# Patient Record
Sex: FEMALE | Race: WHITE | Employment: FULL TIME | URBAN - METROPOLITAN AREA
[De-identification: names, ages, dates, MRNs, and addresses within clinical notes are randomized per-mention and may not be internally consistent; named-entity substitution may affect disease eponyms.]

---

## 2017-05-21 ENCOUNTER — APPOINTMENT (OUTPATIENT)
Dept: GENERAL RADIOLOGY | Age: 57
End: 2017-05-21
Payer: COMMERCIAL

## 2017-05-21 ENCOUNTER — HOSPITAL ENCOUNTER (OUTPATIENT)
Age: 57
Discharge: HOME | End: 2017-05-21
Attending: FAMILY MEDICINE
Payer: COMMERCIAL

## 2017-05-21 VITALS
OXYGEN SATURATION: 97 % | HEART RATE: 72 BPM | DIASTOLIC BLOOD PRESSURE: 66 MMHG | TEMPERATURE: 99 F | RESPIRATION RATE: 16 BRPM | SYSTOLIC BLOOD PRESSURE: 101 MMHG

## 2017-05-21 DIAGNOSIS — S92.534A CLOSED NONDISPLACED FRACTURE OF DISTAL PHALANX OF LESSER TOE OF RIGHT FOOT, INITIAL ENCOUNTER: Primary | ICD-10-CM

## 2017-05-21 PROCEDURE — 73660 X-RAY EXAM OF TOE(S): CPT

## 2017-05-21 PROCEDURE — 99213 OFFICE O/P EST LOW 20 MIN: CPT

## 2017-05-21 PROCEDURE — 28510 TREATMENT OF TOE FRACTURE: CPT

## 2017-05-21 PROCEDURE — 99202 OFFICE O/P NEW SF 15 MIN: CPT

## 2017-05-21 NOTE — ED INITIAL ASSESSMENT (HPI)
Patient presents to [facility]. Care with cc of right third toe injury when she inadvertantly dropped a pan on it. +Swollen,bruised and limited movement and pain with walking.

## 2017-05-21 NOTE — ED PROVIDER NOTES
Patient Seen in: [facility] END    History   Patient presents with:  Lower Extremity Injury (musculoskeletal)    Stated Complaint: toe injury     HPI    Patient is a 61-year-old female.   Coming today with complaint of a right third week      Review of Systems   Constitutional: Positive for activity change. Negative for fever, chills, diaphoresis, appetite change, fatigue and unexpected weight change. HENT: Negative. Eyes: Negative. Respiratory: Negative.     Cardiovascular: Ne seconds. No bleeding noted. Neurological: She is alert and oriented to person, place, and time. Skin: Skin is warm and dry. No rash noted. She is not diaphoretic. No erythema. No pallor. Nursing note and vitals reviewed.             ED Course   Labs

## 2017-08-24 PROCEDURE — 87624 HPV HI-RISK TYP POOLED RSLT: CPT | Performed by: FAMILY MEDICINE

## 2017-08-24 PROCEDURE — 88175 CYTOPATH C/V AUTO FLUID REDO: CPT | Performed by: FAMILY MEDICINE

## 2018-02-12 PROCEDURE — 82607 VITAMIN B-12: CPT | Performed by: OTHER

## 2018-02-12 PROCEDURE — 83921 ORGANIC ACID SINGLE QUANT: CPT | Performed by: OTHER

## 2018-02-12 PROCEDURE — 82746 ASSAY OF FOLIC ACID SERUM: CPT | Performed by: OTHER

## 2018-03-30 PROBLEM — M25.531 RIGHT WRIST PAIN: Status: ACTIVE | Noted: 2018-03-30

## 2018-03-30 PROBLEM — M25.532 LEFT WRIST PAIN: Status: ACTIVE | Noted: 2018-03-30

## 2023-01-13 PROBLEM — D12.3 BENIGN NEOPLASM OF TRANSVERSE COLON: Status: ACTIVE | Noted: 2023-01-13

## 2023-01-13 PROBLEM — Z12.11 SPECIAL SCREENING FOR MALIGNANT NEOPLASMS, COLON: Status: ACTIVE | Noted: 2023-01-13

## 2024-04-12 ENCOUNTER — OFFICE VISIT (OUTPATIENT)
Dept: PODIATRY CLINIC | Facility: CLINIC | Age: 64
End: 2024-04-12
Payer: COMMERCIAL

## 2024-04-12 DIAGNOSIS — M20.42 HAMMERTOE OF LEFT FOOT: Primary | ICD-10-CM

## 2024-04-12 DIAGNOSIS — M20.41 HAMMERTOE OF RIGHT FOOT: ICD-10-CM

## 2024-04-19 NOTE — PROGRESS NOTES
[facility]  Progress Note    Amara Weaver is a 64 year old female. No chief complaint on file.        HPI:     Patient is a pleasant 64-year-old female who presents to clinic for evaluation of toe deformity that causes rubbing on her second toes and great toes.  She typically tapes her toes which works well to prevent rubbing.  Occasionally they cause irritation but they do not cause especially bad pain.  She presents today for reevaluation of the sites.  No other complaints are mentioned.  Past medical history, occasions, allergies reviewed.      Allergies: Patient has no known allergies.   Current Outpatient Medications   Medication Sig Dispense Refill    PEG 3350-KCl-Na Bicarb-NaCl 420 g Oral Recon Soln Take as directed by physician 4000 mL 0    escitalopram (LEXAPRO) 10 MG Oral Tab Take 1 tablet (10 mg total) by mouth daily. 30 tablet 5    triamcinolone acetonide 0.1 % External Cream Apply 1 Application topically 2 (two) times daily.      Calcium Carb-Cholecalciferol (CALCIUM 600+D3 OR) Take by mouth.      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    Osteoporosis    Osteopenia detected    PONV (postoperative nausea and vomiting)    Presence of other cardiac implants and grafts    Multiple breast clips inserted in both breasts - used to fiona areas where breast biopsies were performed (all benign)    Sclerosing adenosis of breast, right    on breast biopsy    Wears glasses    Wear 1 contact for reading, 1 contact for slight distance enhancement      Past Surgical History:   Procedure  Laterality Date    Benign biopsy left      done at [facility]    Benign biopsy right      done at [facility]              Colonoscopy      Colonoscopy      No findings    D & c          Dilation/curettage,diagnostic      Hernia surgery  2019    Repair epigastric hernia with mesh    Alayna biopsy stereo w/calc 1 site right (cpt=19081)       benign    Alayna biopsy stereo w/calc 2 site left (cpt=19081/77486)      both benign    Alayna localization wire 1 site left (cpt=19281)      benign    Mri breast biopsy w/ clip 1 site (cpt=19085)  2011    Right breast benign    Other surgical history      LEFT BREAST BIOPSY 11/15/10    Stereotactic breast biopsy  2011    RIGHT BIOPSY    Us breast biopsy 1 site left (cpt=19083)      benign      Family History   Problem Relation Age of Onset    Lipids Father     Prostate Cancer Father     Breast Cancer Mother 52        unilateral ca    Heart Attack Maternal Grandfather     Breast Cancer Paternal Grandmother         dx early 60's    No Known Problems Daughter     No Known Problems Daughter     Ovarian Cancer Paternal Cousin Female         dx in 20's    Colon Cancer Paternal Cousin Female         dx in 20's    No Known Problems Paternal Uncle     No Known Problems Paternal Uncle     Other (Esophageal Cancer) Paternal Cousin Female         dx mid 50's      Social History     Socioeconomic History    Marital status:    Tobacco Use    Smoking status: Never    Smokeless tobacco: Never    Tobacco comments:     None   Substance and Sexual Activity    Alcohol use: Yes     Alcohol/week: 10.0 standard drinks of alcohol     Types: 10 Glasses of wine per week    Drug use: No           REVIEW OF SYSTEMS:     Today reviewed systems as documented below  GENERAL HEALTH: feels well otherwise  SKIN: denies any unusual skin lesions or rashes  RESPIRATORY: denies shortness of breath with exertion  CARDIOVASCULAR: denies chest  pain on exertion  GI: denies abdominal pain and denies heartburn  NEURO: denies headaches  MUSCULO: history of arthritis      EXAM:   [facility] 06/08/2011   GENERAL: well developed, well nourished, in no apparent distress  EXTREMITIES:   1. Integument: Normal skin temperature and turgor  2. Vascular: Dorsalis pedis two out of four bilateral and posterior tibial pulses two out of   four bilateral, capillary refill normal.   3. Musculoskeletal: All muscle groups are graded 5 out of 5 in the foot and ankle.  Mild to moderate degenerative changes in the great toe joint.  Medial deviation of second and third digits with slight skew foot deformity.  Compartments are soft and compressible.  No strength deficits.   4. Neurological: Normal sharp dull sensation; reflexes normal.        ASSESSMENT AND PLAN:   Diagnoses and all orders for this visit:    Hammertoe of left foot  -     [facility] AMB POD XR - LT FOOT 2 VIEWS(AP, LATERAL)WT BEARING    Hammertoe of right foot  -     [facility] AMB POD XR - RT FOOT 2 VIEWS(AP, LATERAL)WT BEARING        Plan:    -Patient examined, chart history reviewed.  -Discussed etiology of condition and various treatment options.  -X-rays obtained and reviewed.  There are some arthritic changes to great toe joints with medial deviation of second and third digits bilaterally.  -Discussed treatment options including conservative and surgical treatment options.  Conservatively discussed taping or using toe spacers to help prevent rubbing toes.  Patient also ambulate with supportive shoes with wide toe box.  Silicone toe spacers dispensed in clinic the patient can try and see if this helps with rubbing.  -Surgically, if sites to become bothersome or started limiting patient activity, could consider shortening osteotomy to second and third metatarsals with capsular tendon balancing and pinning of digits.  -Surgery briefly discussed.  Patient would like to hold off on this time as the sites are now especially bothersome.   Can reevaluate as needed if pain worsens or other concerns arise.  -All questions answered to satisfaction.  Appreciate the opportunity participate in patient's care.    The patient indicates understanding of these issues and agrees to the plan.        Yfn Borrego DPM    Dragon speech recognition software was used to prepare this note.  Errors in word recognition may occur.  Please contact me with any questions/concerns with this note.

## 2024-12-04 ENCOUNTER — OFFICE VISIT (OUTPATIENT)
Dept: PODIATRY CLINIC | Facility: CLINIC | Age: 64
End: 2024-12-04
Payer: COMMERCIAL

## 2024-12-04 DIAGNOSIS — M19.071 ARTHRITIS OF RIGHT MIDFOOT: Primary | ICD-10-CM

## 2024-12-04 DIAGNOSIS — M79.671 RIGHT FOOT PAIN: ICD-10-CM

## 2024-12-04 PROCEDURE — 99213 OFFICE O/P EST LOW 20 MIN: CPT | Performed by: STUDENT IN AN ORGANIZED HEALTH CARE EDUCATION/TRAINING PROGRAM

## 2024-12-05 NOTE — PROGRESS NOTES
[facility]  Progress Note    Amara Weaver is a 64 year old female.   Chief Complaint   Patient presents with    Follow - Up     Hard bump to top of right foot- dull ache after long walks- patient states bump is the same size          HPI:     Patient is a pleasant 64-year-old female who presents to clinic for evaluation of a hard bump that she has noticed on the top of her right foot.  She does get a dull ache to site after long walk.  She does tend to walk with supportive [place] tennis shoes.  Denies any recent trauma or injury or other concerns.    Allergies: Patient has no known allergies.   Current Outpatient Medications   Medication Sig Dispense Refill    escitalopram (LEXAPRO) 10 MG Oral Tab Take 1 tablet (10 mg total) by mouth daily. 30 tablet 5    DAILY MULTIVITAMIN OR 1 TABLET DAILY      PEG 3350-KCl-Na Bicarb-NaCl 420 g Oral Recon Soln Take as directed by physician 4000 mL 0    triamcinolone acetonide 0.1 % External Cream Apply 1 Application topically 2 (two) times daily.      Calcium Carb-Cholecalciferol (CALCIUM 600+D3 OR) Take by mouth.        Past Medical History:    Abdominal hernia    Had surgery for epigastric hernia in Dec 2019 - mesh repair was used    Anxiety    Primary MD prescribed Escitalopram (10 mg) - also going to therapy every other week    Arthritis    Osteoarthritis in knees due to years of running / age    Body piercing    Since a teenager    Itch of skin    Dermatologist diagnosed [place]'s disease which periodically surfaces on torso    Osteoporosis    Osteopenia detected    PONV (postoperative nausea and vomiting)    Presence of other cardiac implants and grafts    Multiple breast clips inserted in both breasts - used to fiona areas where breast biopsies were performed (all benign)    Sclerosing adenosis of breast, right    on breast biopsy    Wears glasses    Wear 1 contact for reading, 1 contact for slight distance enhancement      Past Surgical History:   Procedure  Laterality Date    Benign biopsy left      done at [facility]    Benign biopsy right      done at [facility]              Colonoscopy      Colonoscopy      No findings    D & c          Dilation/curettage,diagnostic      Hernia surgery  2019    Repair epigastric hernia with mesh    Alayna biopsy stereo w/calc 1 site right (cpt=19081)       benign    Alayna biopsy stereo w/calc 2 site left (cpt=19081/51075)      both benign    Alayna localization wire 1 site left (cpt=19281)      benign    Mri breast biopsy w/ clip 1 site (cpt=19085)  2011    Right breast benign    Other surgical history      LEFT BREAST BIOPSY 11/15/10    Stereotactic breast biopsy  2011    RIGHT BIOPSY    Us breast biopsy 1 site left (cpt=19083)      benign      Family History   Problem Relation Age of Onset    Lipids Father     Prostate Cancer Father     Breast Cancer Mother 52        unilateral ca    Heart Attack Maternal Grandfather     Breast Cancer Paternal Grandmother         dx early 60's    No Known Problems Daughter     No Known Problems Daughter     Ovarian Cancer Paternal Cousin Female         dx in 20's    Colon Cancer Paternal Cousin Female         dx in 20's    No Known Problems Paternal Uncle     No Known Problems Paternal Uncle     Other (Esophageal Cancer) Paternal Cousin Female         dx mid 50's      Social History     Socioeconomic History    Marital status:    Tobacco Use    Smoking status: Never    Smokeless tobacco: Never    Tobacco comments:     None   Substance and Sexual Activity    Alcohol use: Yes     Alcohol/week: 10.0 standard drinks of alcohol     Types: 10 Glasses of wine per week    Drug use: No           REVIEW OF SYSTEMS:     Today reviewed systems as documented below  GENERAL HEALTH: feels well otherwise  SKIN: denies any unusual skin lesions or rashes  RESPIRATORY: denies shortness of breath with exertion  CARDIOVASCULAR: denies chest  pain on exertion  GI: denies abdominal pain and denies heartburn  NEURO: denies headaches  MUSCULO: history of arthritis      EXAM:   [facility] 06/08/2011   GENERAL: well developed, well nourished, in no apparent distress  EXTREMITIES:   1. Integument: Normal skin temperature and turgor  2. Vascular: Dorsalis pedis two out of four bilateral and posterior tibial pulses two out of   four bilateral, capillary refill normal.   3. Musculoskeletal: All muscle groups are graded 5 out of 5 in the foot and ankle.  Mild to moderate degenerative changes in the great toe joint.  Medial deviation of second and third digits with slight skew foot deformity.  Compartments are soft and compressible.  No strength deficits. Midfoot exostosis to right 2nd TMTJ.   4. Neurological: Normal sharp dull sensation; reflexes normal.    Right foot x-rays: 12/4/2024:    Skin marker over second tarsometatarsal joint where some joint space narrowing, subchondral sclerosis,  and exostosis formation is appreciated.     ASSESSMENT AND PLAN:   Diagnoses and all orders for this visit:    Arthritis of right midfoot  -     [facility] AMB POD XR - RT ANKLE 3 VIEWS(AP,LAT,MORTISE)WT BEARING    Right foot pain        Plan:    -Patient examined, chart history reviewed.  -Discussed etiology of condition and various treatment options.  -X-rays obtained and reviewed.  Area of spur does correspond to second TMTJ where exostosis is noted.   -Discussed condition and various treatment options including conservative and surgical treatment options.  -Discussed importance of supportive shoes and inserts.  Can use Redi orthotics as well as custom inserts.  Information dispensed for custom insert authorization.  -If symptoms worsen or fail to improve could consider cortisone injection.  -Surgically, could consider exostectomy versus joint fusion.  Does not appear necessary at this time.  Can continue to monitor.  Will consider cortisone injection if symptoms worsen or fail to  improve.  -All questions answered to satisfaction.  Appreciate the opportunity participate in patient's care.    The patient indicates understanding of these issues and agrees to the plan.        MAG[employer] Cadet speech recognition software was used to prepare this note.  Errors in word recognition may occur.  Please contact me with any questions/concerns with this note.

## (undated) NOTE — ED AVS SNAPSHOT
[facility] in [address] [place] [address],[address]    [address] [facility]    Phone:  [phone]    Fax:  [address]   MRN: [id number]    Department:  [facility] END   Date of Visit:  5/21/2017 from our patient liason soon after your visit. Also, some patients receive a detailed feedback survey mailed to them a week after the visit. If you receive this, we would really appreciate it if you could take the time to complete it. Thank you!       You [facility] [address] [facility] [address] ([address] [facility] [id number] [address] ([address]) [facility] [address] [facility]. ([address]) [place] Call Approved by: Екатерина Khan MD                    MyChart     Visit [facility] [place]  You can access your MyChart to more actively manage your health care and view more details from this visit by going to [email or web address].   If you've recently had a